# Patient Record
(demographics unavailable — no encounter records)

---

## 2024-12-10 NOTE — CONSULT LETTER
[Dear  ___] : Dear  [unfilled], [Consult Letter:] : I had the pleasure of evaluating your patient, [unfilled]. [Please see my note below.] : Please see my note below. [Consult Closing:] : Thank you very much for allowing me to participate in the care of this patient.  If you have any questions, please do not hesitate to contact me. [Sincerely,] : Sincerely, [FreeTextEntry3] : Carmen Stevenson MD [facility] Division of Pediatric Endocrinology  [facility] at [facility]

## 2024-12-10 NOTE — CONSULT LETTER
[Dear  ___] : Dear  [unfilled], [Consult Letter:] : I had the pleasure of evaluating your patient, [unfilled]. [Please see my note below.] : Please see my note below. [Consult Closing:] : Thank you very much for allowing me to participate in the care of this patient.  If you have any questions, please do not hesitate to contact me. [Sincerely,] : Sincerely, [FreeTextEntry3] : Carmen Stevenson MD [facility] Division of Pediatric Endocrinology  [facility] at \[facility] [place] [facility]\""/[facility]

## 2024-12-10 NOTE — PHYSICAL EXAM
[Healthy Appearing] : healthy appearing [Normal Appearance] : normal appearance [Well formed] : well formed [Normal S1 and S2] : normal S1 and S2 [Clear to Ausculation Bilaterally] : clear to auscultation bilaterally [Abdomen Soft] : soft [___] : [unfilled] [Normal] : grossly intact

## 2024-12-10 NOTE — ASSESSMENT
[FreeTextEntry1] :  Wicho is a 13y 9m old male here for follow up of growth hormone deficiency. He has PMH of anxiety. Today he is at the 16th percentile for height and 86th percentile for BMI. Since the visit in 7/2024, he grew at a rate of 5.82 cm/yr, and some of that time he was not on GH. Wicho's parents spoke to me when he was outside and explained they are not sure what to do with the Fluoxetine as they are worried it will affect his growth. I explained that his mental status is not less important and that if he needs the medication, he can take it. Today he will complete screening blood work. Based on his IGF-1 level we can consider changing his dose. They will complete the labs at 8 AM as I ordered cortisol level. I will call them with the results. Follow up in 4 months with Dr. Gallardo.

## 2024-12-10 NOTE — HISTORY OF PRESENT ILLNESS
[Headaches] : no headaches [Knee Pain] : no knee pain [Hip Pain] : no hip pain [Constipation] : no constipation [Cold Intolerance] : no cold intolerance [Fatigue] : no fatigue [Weakness] : no weakness [Anorexia] : no anorexia [Abdominal Pain] : no abdominal pain [FreeTextEntry2] :  Wicho is a 13y 9m old male here for follow up of growth hormone deficiency. He has PMH of anxiety.  He was initially seen by Dr. Gallardo in 3/2024. Work up was done after the visit and showed bone age x-ray performed on 3/19/2024. At chronologic age 12 11/12 years the bone age was 13 years. The BoneXpert (V3.1) adult height prediction is170.8 cm (67.2 inches). Blood work showed IGF-1 159 (z= -0.61). The rest was normal.  He was seen again in 7/2024, growth velocity was 2.8 cm/yr.   Wicho underwent growth hormone stimulation testing on 7/24/2024 with a peak growth hormone value of 2.82 ng/mL (deficient). Brain MRI was done in 8/2024 and showed normal pituitary gland. He started growth hormone treatment in the summer.   His parents report he is doing well with the treatment. They gradually weaned his Fluoxetine treatment because it can interfere with growth and pubertal development. It was done with the psychiatrist. He is more anxious but ok for now.

## 2025-02-05 NOTE — PHYSICAL EXAM
[Left] : left foot and ankle [Right] : right foot and ankle [NL (40)] : plantar flexion 40 degrees [NL 30)] : inversion 30 degrees [NL (20)] : eversion 20 degrees [5___] : eversion 5[unfilled]/5 [2+] : posterior tibialis pulse: 2+ [Normal] : saphenous nerve sensation normal [4___] : plantar flexion 4[unfilled]/5 [] : non-antalgic [Bilateral] : ankle bilaterally [Weight -] : weightbearing [There are no fractures, subluxations or dislocations. No significant abnormalities are seen] : There are no fractures, subluxations or dislocations. No significant abnormalities are seen [FreeTextEntry3] : Mild pes planus.

## 2025-02-05 NOTE — HISTORY OF PRESENT ILLNESS
[de-identified] : Patient is a 13-year-old male here for his ankles. Denies trauma. States he has been having pain in the ankles for the past year. States the pain gets worse during football and lacrosse season. Denies any previous injuries/problems with either ankle. Wb in sneakers. No formal treatment to date.  [FreeTextEntry1] : b/l ankles.

## 2025-02-05 NOTE — DISCUSSION/SUMMARY
Health Maintenance Summary     Topic Due On Due Status Completed On    PAP SMEAR - CERVICAL CANCER SCREENING Feb 2, 2019 Not Due Feb 2, 2016    Immunization - TDAP Pregnancy  Hidden     IMMUNIZATION - DTaP/Tdap/Td Aug 3, 2025 Not Due Aug 3, 2015    Immunization-Influenza Sep 1, 2018 Not Due Oct 6, 2015    Depression Screening Feb 15, 1986 Overdue           Patient is up to date, no discussion needed .             [de-identified] : WBAT in supportive footwear, heel lifts.   Recommend a course of PT. Ice to affected area. Stretching exercises recommended and demonstrated to patient. NSAIDS prn.

## 2025-03-03 NOTE — CONSULT LETTER
[Dear  ___] : Dear  [unfilled], [Courtesy Letter:] : I had the pleasure of seeing your patient, [unfilled], in my office today. [Please see my note below.] : Please see my note below. [Consult Closing:] : Thank you very much for allowing me to participate in the care of this patient.  If you have any questions, please do not hesitate to contact me. [Sincerely,] : Sincerely, [FreeTextEntry3] : Guerline Gallardo MD Pediatric Endocrinologist

## 2025-03-03 NOTE — PHYSICAL EXAM
[Healthy Appearing] : healthy appearing [Well Nourished] : well nourished [Interactive] : interactive [Normal Appearance] : normal appearance [Well formed] : well formed [Normally Set] : normally set [Normal S1 and S2] : normal S1 and S2 [Clear to Ausculation Bilaterally] : clear to auscultation bilaterally [Abdomen Soft] : soft [Abdomen Tenderness] : non-tender [] : no hepatosplenomegaly [2] : was Abdirahman stage 2 [Testes] : normal [___] : [unfilled] [Normal] : normal  [Murmur] : no murmurs [Scoliosis] : scoliosis not appreciated [FreeTextEntry1] : Axillary hair: none [de-identified] : Hips full range of motion

## 2025-03-03 NOTE — HISTORY OF PRESENT ILLNESS
[FreeTextEntry2] : Wicho is a 13y 10m male here for follow up of growth hormone deficiency.  Wicho was diagnosed with growth hormone deficiency on 7/24/2024 with a peak growth hormone value of 2.82 ng/mL. MRI was normal. He started growth hormone therapy summer 2024.  Wicho has been tolerating growth hormone injections well. Wicho denies polyuria, polydipsia, nocturia, headaches, vision changes, peripheral swelling, and hip pains. Wicho has been outgrowing clothes and shoes (size 10). Wicho reports no missed doses of growth hormone. Wicho has been active working out with a  and has lost weight since last visit.   Since last visit, WICHO has grown 3.3 cm and lost 3 lbs. Height velocity 14.3 cm/yr. Mother's height 65.5 inches Father's height 70.5 inches Mid-parental sex-adjusted target height 70.5 inches.

## 2025-03-03 NOTE — ASSESSMENT
[FreeTextEntry1] : Wicho is a 13y 10m male with growth hormone deficiency and an excellent growth velocity on growth hormone therapy.  Plan - Continue Norditropin 2.2 mg daily (0.3 mg/kg/w).  - Continue active lifestyle.  - Continue to monitor growth and development.  - Follow up in 3 months.   Guerline Gallardo MD Pediatric Endocrinology [address], [address] [place] [address] [phone]